# Patient Record
Sex: MALE
[De-identification: names, ages, dates, MRNs, and addresses within clinical notes are randomized per-mention and may not be internally consistent; named-entity substitution may affect disease eponyms.]

---

## 2019-04-25 LAB
ALBUMIN SERPL-MCNC: 4.3 G/DL (ref 3.5–5)
ALBUMIN/GLOB SERPL: 1.1 {RATIO} (ref 0.8–2)
ALP SERPL-CCNC: 82 IU/L (ref 40–150)
ALT SERPL-CCNC: 43 IU/L (ref 0–55)
ANION GAP SERPL CALC-SCNC: 16.2 MMOL/L (ref 8–16)
BASOPHILS # BLD AUTO: 0 10*3/UL (ref 0–0.1)
BASOPHILS NFR BLD AUTO: 0.6 % (ref 0–1)
BUN SERPL-MCNC: 14 MG/DL (ref 7–26)
BUN/CREAT SERPL: 15 (ref 6–25)
CALCIUM SERPL-MCNC: 9.9 MG/DL (ref 8.4–10.2)
CHLORIDE SERPL-SCNC: 101 MMOL/L (ref 98–107)
CO2 SERPL-SCNC: 25 MMOL/L (ref 22–29)
DEPRECATED INR PLAS: 0.93
DEPRECATED NEUTROPHILS # BLD AUTO: 3.8 10*3/UL (ref 2.1–6.9)
EGFRCR SERPLBLD CKD-EPI 2021: > 60 ML/MIN (ref 60–?)
EOSINOPHIL # BLD AUTO: 0.1 10*3/UL (ref 0–0.4)
EOSINOPHIL NFR BLD AUTO: 1.6 % (ref 0–6)
ERYTHROCYTE [DISTWIDTH] IN CORD BLOOD: 12.4 % (ref 11.7–14.4)
GLOBULIN PLAS-MCNC: 4 G/DL (ref 2.3–3.5)
GLUCOSE SERPLBLD-MCNC: 82 MG/DL (ref 74–118)
HCT VFR BLD AUTO: 52.1 % (ref 38.2–49.6)
HGB BLD-MCNC: 16.8 G/DL (ref 14–18)
LYMPHOCYTES # BLD: 2.6 10*3/UL (ref 1–3.2)
LYMPHOCYTES NFR BLD AUTO: 36.7 % (ref 18–39.1)
MCH RBC QN AUTO: 31.2 PG (ref 28–32)
MCHC RBC AUTO-ENTMCNC: 32.2 G/DL (ref 31–35)
MCV RBC AUTO: 96.7 FL (ref 81–99)
MONOCYTES # BLD AUTO: 0.6 10*3/UL (ref 0.2–0.8)
MONOCYTES NFR BLD AUTO: 7.9 % (ref 4.4–11.3)
NEUTS SEG NFR BLD AUTO: 52.9 % (ref 38.7–80)
PLATELET # BLD AUTO: 200 X10E3/UL (ref 140–360)
POTASSIUM SERPL-SCNC: 4.2 MMOL/L (ref 3.5–5.1)
PROTHROMBIN TIME: 13 SECONDS (ref 11.9–14.5)
RBC # BLD AUTO: 5.39 X10E6/UL (ref 4.3–5.7)
SODIUM SERPL-SCNC: 138 MMOL/L (ref 136–145)

## 2019-04-26 ENCOUNTER — HOSPITAL ENCOUNTER (OUTPATIENT)
Dept: HOSPITAL 88 - CATH LAB | Age: 49
Discharge: HOME | End: 2019-04-26
Attending: INTERNAL MEDICINE
Payer: COMMERCIAL

## 2019-04-26 VITALS — DIASTOLIC BLOOD PRESSURE: 84 MMHG | SYSTOLIC BLOOD PRESSURE: 126 MMHG

## 2019-04-26 VITALS — HEIGHT: 71 IN | WEIGHT: 210 LBS | BODY MASS INDEX: 29.4 KG/M2

## 2019-04-26 VITALS — SYSTOLIC BLOOD PRESSURE: 147 MMHG | DIASTOLIC BLOOD PRESSURE: 63 MMHG

## 2019-04-26 VITALS — SYSTOLIC BLOOD PRESSURE: 127 MMHG | DIASTOLIC BLOOD PRESSURE: 70 MMHG

## 2019-04-26 VITALS — DIASTOLIC BLOOD PRESSURE: 100 MMHG | SYSTOLIC BLOOD PRESSURE: 137 MMHG

## 2019-04-26 VITALS — SYSTOLIC BLOOD PRESSURE: 124 MMHG | DIASTOLIC BLOOD PRESSURE: 90 MMHG

## 2019-04-26 VITALS — DIASTOLIC BLOOD PRESSURE: 85 MMHG | SYSTOLIC BLOOD PRESSURE: 129 MMHG

## 2019-04-26 VITALS — DIASTOLIC BLOOD PRESSURE: 77 MMHG | SYSTOLIC BLOOD PRESSURE: 144 MMHG

## 2019-04-26 VITALS — DIASTOLIC BLOOD PRESSURE: 88 MMHG | SYSTOLIC BLOOD PRESSURE: 129 MMHG

## 2019-04-26 VITALS — DIASTOLIC BLOOD PRESSURE: 91 MMHG | SYSTOLIC BLOOD PRESSURE: 131 MMHG

## 2019-04-26 VITALS — DIASTOLIC BLOOD PRESSURE: 95 MMHG | SYSTOLIC BLOOD PRESSURE: 121 MMHG

## 2019-04-26 VITALS — SYSTOLIC BLOOD PRESSURE: 119 MMHG | DIASTOLIC BLOOD PRESSURE: 67 MMHG

## 2019-04-26 VITALS — SYSTOLIC BLOOD PRESSURE: 135 MMHG | DIASTOLIC BLOOD PRESSURE: 75 MMHG

## 2019-04-26 VITALS — SYSTOLIC BLOOD PRESSURE: 136 MMHG | DIASTOLIC BLOOD PRESSURE: 97 MMHG

## 2019-04-26 VITALS — DIASTOLIC BLOOD PRESSURE: 119 MMHG | SYSTOLIC BLOOD PRESSURE: 189 MMHG

## 2019-04-26 VITALS — DIASTOLIC BLOOD PRESSURE: 90 MMHG | SYSTOLIC BLOOD PRESSURE: 124 MMHG

## 2019-04-26 VITALS — DIASTOLIC BLOOD PRESSURE: 66 MMHG | SYSTOLIC BLOOD PRESSURE: 129 MMHG

## 2019-04-26 DIAGNOSIS — Z88.0: ICD-10-CM

## 2019-04-26 DIAGNOSIS — I25.10: Primary | ICD-10-CM

## 2019-04-26 DIAGNOSIS — I10: ICD-10-CM

## 2019-04-26 DIAGNOSIS — R06.09: ICD-10-CM

## 2019-04-26 DIAGNOSIS — Z01.812: ICD-10-CM

## 2019-04-26 DIAGNOSIS — R94.39: ICD-10-CM

## 2019-04-26 DIAGNOSIS — E78.5: ICD-10-CM

## 2019-04-26 DIAGNOSIS — Z96.652: ICD-10-CM

## 2019-04-26 DIAGNOSIS — Z82.49: ICD-10-CM

## 2019-04-26 DIAGNOSIS — R55: ICD-10-CM

## 2019-04-26 PROCEDURE — 93458 L HRT ARTERY/VENTRICLE ANGIO: CPT

## 2019-04-26 PROCEDURE — 80053 COMPREHEN METABOLIC PANEL: CPT

## 2019-04-26 PROCEDURE — 92928 PRQ TCAT PLMT NTRAC ST 1 LES: CPT

## 2019-04-26 PROCEDURE — 85025 COMPLETE CBC W/AUTO DIFF WBC: CPT

## 2019-04-26 PROCEDURE — 36415 COLL VENOUS BLD VENIPUNCTURE: CPT

## 2019-04-26 PROCEDURE — 85610 PROTHROMBIN TIME: CPT

## 2019-04-26 NOTE — NUR
1330 TR band titration initiated 15cc balloon 

1330 -2cc positive 13cc no sign pain,pallor,or bleeding

1345 -2cc positive 11cc no sign pain,pallor,or bleeding

1400 -2cc positive 9cc no sign pain,pallor,or bleeding

1415 -2cc positive 7cc no sign pain pallor,or bleeding

1430 -2cc positive 5cc no sign pain pallor ,or bleeding

1445 -2cc positive 3cc no sign pain pallor,or bleeding

1500 tr band off stasis achieved Coban dressing and splint in place no issues pain,pallor 
pressure or dysrhythmia.

Radial pulse adequate.Explained dc plans understands precaution and importance of followup 
care

albert/rn

## 2019-04-26 NOTE — XMS REPORT
Patient Summary Document

                             Created on: 2019



NORMAN FOSS

External Reference #: [id number]

: 1970

Sex: Male



Demographics







                          Address                   [address] [place] [address]  [address]

 

                          Home Phone                [phone]

 

                          Preferred Language        Unknown

 

                          Marital Status            Unknown

 

                          Denominational Affiliation     Unknown

 

                          Race                      Unknown

 

                          Ethnic Group              Unknown





Author







                          Author                    [facility]

 

                          Address                   Unknown

 

                          Phone                     Unavailable







Care Team Providers







                    Care Team Member Name    Role                Phone

 

                    Adalberto Cabrera     Unavailable         Unavailable

 

                    JARED SCHROEDER      Unavailable         Unavailable

 

                    NOAH VOGEL    Unavailable         Unavailable







Problems

This patient has no known problems.



Allergies, Adverse Reactions, Alerts

This patient has no known allergies or adverse reactions.



Medications

This patient has no known medications.



Results







           Test Description    Test Time    Test Comments    Text Results    Atomic Results    Result

 Comments

 

                Basic Metabolic Panel    2019 15:57:00                      

 

   

 

                          Serum or plasma sodium measurement (moles/volume) (test code=2951-2)    141 mmol/L

                          136-145                    

 

                    Potassium [Moles/volume] in Serum or Plasma (test code=2823-3)    4.0 mmol/L          3.5-5.1

                                         

 

                    Chloride [Moles/volume] in Serum or Plasma (test code=5-0)    107 mmol/L          

                                         

 

                          Carbon dioxide, total [Moles/volume] in Serum or Plasma (test code=8-9)    26 mmol/L

                          21-32                      

 

                Glucose [Mass/volume] in Serum or Plasma (test code=2345-7)    86 mg/dL                   



 

                    Urea nitrogen [Mass/volume] in Serum or Plasma (test code=3094-0)    18 mg/dL            7-18

                                         

 

                    Creatinine [Mass/volume] in Serum or Plasma (test code=2160-0)    0.96 mg/dL          0.55-1.3

                                         

 

                Glomerular Filtration Rate (test code=GFR)    84 mL           =/>90           FOR CHRONIC KIDNEY DISEASE:

  GFR       STAGE           DESCRIPTION=/>90    STAGE 1    NORMAL--OR--         
             MINIMAL KIDNEY DAMAGE WITH NORMAL GFR  60-89    STAGE 2    MILD 
DECREASE IN GFR  30-59    STAGE 3    MODERATE DECREASE IN GFR  15-29    STAGE 4 
   SEVERE DECREASE IN GFR  <15      STAGE 5    KIDNEY FAILURE The Glomerular 
Filtration Rate (GFR) has been calculated using the IDMS-Traceable MDRD Study 
Equation.

 

                    Calcium [Mass/volume] in Serum or Plasma (test code=17861-6)    8.6 mg/dL           8.5-10.1

                                         





Comment Bed:20 Test Ordered to Rule Out VTE/DVT? NLiver (Hepatic) Function
2019 15:57:00* 





                Test Item       Value           Reference Range    Comments

 

                                        Aspartate aminotransferase [Enzymatic activity/volume] in Serum or Plasma by With

 P-5 (test code=30239-8)    31 U/L              15-37                

 

                                        Alanine aminotransferase [Enzymatic activity/volume] in Serum or Plasma by With 

P-5'- (test code=1743-4)    58 U/L              12-78                

 

                                        Alkaline phosphatase [Enzymatic activity/volume] in Serum or Plasma (test code=6768-6)

                    80 U/L                             

 

                    Bilirubin.total [Mass/volume] in Serum or Plasma (test code=-2)    0.6 mg/dL           

0.2-1.0                                  

 

                          Bilirubin.direct [Mass/volume] in Serum or Plasma (test code=-7)    0.1 mg/dL 

                          0-0.2                      

 

                Protein [Mass/volume] in Serum or Plasma (test code=2885-2)    8.0 g/dL        6.4-8.2         

 

 

                                        Albumin [Mass/volume] in Serum or Plasma by Bromocresol purple (BCP) dye binding

 meth (test code=61152-5)    4.2 g/dL            3.4-5.0              

 

                Globulin (test code=GLOB)    3.8 g/dL        2.3-3.5          

 

                Albumin/Globulin Ratio (test code=A/G)    1.1             1.1-1.8          





Comment Bed:20 Test Ordered to Rule Out VTE/DVT? NTroponin I.cardiac 
[Mass/volume] in Serum or [id number] 15:57:00* 





                Test Item       Value           Reference Range    Comments

 

                          Troponin I.cardiac [Mass/volume] in Serum or Plasma (test code=10839-9)    <0.02 ng/mL

                          0.0-0.045                  





Comment Bed:20 Test Ordered to Rule Out VTE/DVT? NNatriuretic peptide.B 
prohormone N-Terminal [Mass/volume] in Serum or [id number] 15:57:00* 





                Test Item       Value           Reference Range    Comments

 

                                        Natriuretic peptide.B prohormone N-Terminal [Mass/volume] in Serum or Plasma (test

 code=33762-6)      48 pg/mL            <125                 





Comment Bed:20 Test Ordered to Rule Out VTE/DVT? NMagnesium [Mass/volume] in 
Serum or [id number] 15:57:00* 





                Test Item       Value           Reference Range    Comments

 

                    Magnesium [Mass/volume] in Serum or Plasma (test code=19123-9)    2.3 mg/dL           1.8-2.4

                                         





Comment Bed:20 Test Ordered to Rule Out VTE/DVT? NComplete blood count (CBC) 
with automated white blood cell (WBC) [id number] 15:51:00* 





                Test Item       Value           Reference Range    Comments

 

                White blood cell count (test code=IMO0002)    8.8             4.3-10.9         

 

                    Blood erythrocytes count (number/volume) (test code=26453-1)    5.09 M/ul           4.33-5.43

                                         

 

                Hemoglobin measurement (test code=IMO0002)    16.2 g/dL       13.6-17.9        

 

                Blood hematocrit (volume fraction) (test code=20570-8)    47.2 %          39.6-49.0        

 

                MCV (test code=IMO0001)    92.9 fL                   31.9

 

                MCHC (test code=MCHC)    34.4 g/dL       32.0-36.0        

 

                Platelets (test code=PLT)    241             152-406          

 

                Red Cell Distribution Width (test code=RDW)    13.3 %          12.1-15.2        

 

                Blood platelet mean volume (test code=28542-9)    7.9 fL          7.6-11.3         

 

                Neutrophils % (test code=MARILYN%)    65.6 %          41.7-73.7        

 

                Lymphocytes/leuk NFr Bld (test code=26478-8)    25.2 %          15.3-44.8        

 

                Monocyte percentage (test code=5905-5)    8.0 %           3.3-12.3         

 

                Eosinophil % (test code=713-8)    0.8 %           0-4.4            

 

                Basophil % (test code=26445-7)    0.4 %           0-1.3            

 

                Absolute neutrophil count (test code=751-8)    5.8             1.8-8.0          

 

                Absolute lymphocyte count (test code=20585-6)    2.2             0.7-4.9          

 

                Absolute monocyte count (test code=742-7)    0.7             0.1-1.3          

 

                Absolute Eosinophils (test code=EOA)    0.1             0-0.5            

 

                Absolute Basophils (test code=BASA)    0.0             0-0.5            





Prothrombin time (PT) with international normalized ratio (INR)2019 
15:50:00* 





                Test Item       Value           Reference Range    Comments

 

                PT Prothrombin Time (test code=PROTIME)    12.0 s          9.5-12.5         

 

                INR in Blood by Coagulation assay (test code=34714-6)    1.02                            Monitor pts using

 INR value (not prothrombin time)   INR Coumadin Therapy: Low Range 
(prophylaxis) 2.0-3.0 High Range (high risk of clot formation) 2.5-3.5





NChest Single [id number] 15:38:00[facility] [place] [address]      RADIOLOGY SERVICES REPORT    Name: 
NORMAN FOSS         Acct Number: [id number]  :1970 Age:48 
Sex:M Ord Phys: [facility]            Unit Number: [id number]         May 
Care Dr: JOYCE  Status: REG ER ER    Accession #: [id number]                  
Exam Date: 19    EXAM DESCRIPTION:  [facility] Single View2019 3:31 pm 
      CLINICAL HISTORY:  Chest pain       COMPARISON:  none       FINDINGS:   
The lungs appear clear of acute infiltrate. The heart is normal size       
IMPRESSION:   No acute abnormalities displayed             Signed By: 
Justin Villafuerte MD        Signed AT: 19 1538           FL, RAD TECH IN 
OR/30 MINUTE [id number] 17:07:00Reason for exam:->right infinity total
 ankle replacement calcaneal oseotomyPROCEDURE PERFORMED IN O.R. - PLEASE REFER 
TO THE INTRAOPERATIVE REPORT. Electronically signed by: ELECTRONICALLYVER BY 
RADIOLOGY on 2018 05:07 PM CT, CHEST WITH IV CONTRAST- PE TEST DESIGN
2018 21:48:00Reason for exam:->LEG PAINReason for exam:->CHEST PAINWhat is
 the patient's sedation requirement?->No SedationFINAL REPORT PATIENT ID:   
[id number] TECHNIQUE: CT scan of the chest WITH intravenous contrast. Dose 
modulation, iterative reconstruction, and/or weight-based adjustment of the 
mA/kV was utilized to reduce the radiation dose to as low as reasonably 
achievable. INDICATION: 47-year-old man with leg pain and chest pain. CO
MPARISON: None.  FINDINGS:  LINES/TUBES: None. PULMONARY ARTERIES: Proximal to t
he bifurcation of the main pulmonary artery, the main pulmonary artery is 2.3 cm
 in diameter.  No filling defects within the pulmonary arteries to suggest pulmo
nary embolus. LUNGS AND AIRWAYS: The lungs and airways are normal without focal 
abnormality. PLEURA: The pleural spaces are clear. HEART AND MEDIASTINUM: The vi
sualized thyroid gland is normal. No significant mediastinal, hilar, or axillary
 lymphadenopathy. The heart and pericardium are within normal limits. SOFT TISSU
ES AND BONES: Unremarkable. UPPER ABDOMEN: Decreased attenuation of the liver, c
onsistent with hepatic steatosis. The gastric fundus is wrapped around the dista
l esophagus above the diaphragm.  IMPRESSION:No pulmonary emboli or other acute 
abnormalities in the chest. Findings at the gastroesophageal junction may repres
ent changes from prior fundoplication, which has herniated above the diaphragm, 
versus paraesophageal hiatal hernia. Signed: Alex Muñozort Verified D
ate/Time:  2018 21:48:27 Reading Location: [facility] C013 Consult Reading Ro
om    Electronically signed by: ALEX MUÑOZ MD on 2018 09:48 PM 
RAD, CHEST, 1 VIEW, NON [id number] 14:44:00Reason for exam:->LEG PAINReason 
for exam:->CHEST PAINFINAL REPORT PATIENT ID:   [id number] Chest one view 
INDICATION: Leg pain, chest pain COMPARISON: None available IMPRESSION: There is
 no focal consolidation, vascular congestion, pleural effusion, or pneumothorax.
 Heart size is at the upper limit of normal. The aorta is mildly 
ectatic/tortuous. There are degenerative spine changes. Signed: Anu Hortoneport Verified Date/Time:  2018 14:44:51 Reading Location: 
[facility] B1 C013W Consult Reading Room   Electronically signed by: ANU HORTON M.D. on 2018 02:44 PM B-TYPE NATRIURETIC FACTOR (BNP)2018 
14:35:00* 





                Test Item       Value           Reference Range    Comments

 

                B-TYPE NATRIURETIC PEPTIDE (BEAKER) (test code=700)    40 pg/mL        0-100            





CREATINE KINASE (CK), TOTAL AND [id number] 14:30:00* 





                Test Item       Value           Reference Range    Comments

 

                CREATINE KINASE TOTAL (BEAKER) (test code=380)    223 U/L                    

 

                CREATINE KINASE-MB (BEAKER) (test code=750)    1.0 ng/mL       0.0-6.6          

 

                CREATINE KINASE-MB INDEX (BEAKER) (test code=395)    0.4 %                            





CK-MB Reference Range:<6.7      Normal6.7-10.0  Borderline>10.0     Abnormal
TROPONIN -17-69 14:30:00* 





                Test Item       Value           Reference Range    Comments

 

                TROPONIN I (BEAKER) (test code=397)    0.01 ng/mL      0.00-0.03        





Troponin I (TnI) levels must be interpreted in the context of the presenting sym
ptoms and the clinical findings. Elevated TnI levels indicate myocardial damage,
 but are not specific for ischemic heart disease. Elevated TnI levels are seen i
n patients with other cardiac conditions (including myocarditis and congestive h
eart failure), and slight TnI elevations occur in patients with other conditions
, including sepsis, renal failure, acidosis, acute neurological disease, and per
sistent tachyarrhythmia.[id number] 14:23:00* 





                Test Item       Value           Reference Range    Comments

 

                MAGNESIUM (BEAKER) (test code=627)    2.1 mg/dL       1.6-2.6          





BASIC METABOLIC [id number] 14:23:00* 





                Test Item       Value           Reference Range    Comments

 

                SODIUM (BEAKER) (test code=381)    141 meq/L       136-145          

 

                POTASSIUM (BEAKER) (test code=379)    4.1 meq/L       3.5-5.1          

 

                CHLORIDE (BEAKER) (test code=382)    106 meq/L                  

 

                CO2 (BEAKER) (test code=355)    25 meq/L        22-29            

 

                BLOOD UREA NITROGEN (BEAKER) (test code=354)    13 mg/dL        7-21             

 

                CREATININE (BEAKER) (test code=358)    0.97 mg/dL      0.57-1.25        

 

                GLUCOSE RANDOM (BEAKER) (test code=652)    82 mg/dL                   

 

                CALCIUM (BEAKER) (test code=697)    9.4 mg/dL       8.4-10.2         

 

                EGFR (BEAKER) (test code=1092)    83 mL/min/1.73 sq m                    ESTIMATED GFR IS NOT AS 

ACCURATE AS CREATININE CLEARANCE IN PREDICTING GLOMERULAR FILTRATION RATE. 
ESTIMATED GFR IS NOT APPLICABLE FOR DIALYSIS PATIENTS.





PT/[id number] 14:20:00* 





                Test Item       Value           Reference Range    Comments

 

                PROTIME (BEAKER) (test code=759)    14.1 seconds    11.7-14.7        

 

                INR (BEAKER) (test code=370)    1.1             <=5.9            

 

                PARTIAL THROMBOPLASTIN TIME (BEAKER) (test code=760)    30.1 seconds    22.5-36.0        







RECOMMENDED COUMADIN/WARFARIN INR THERAPY RANGESSTANDARD DOSE: 2.0 - 3.0   Inclu
jaycob: PROPHYLAXIS for venous thrombosis, systemic embolization; TREATMENT for shahida
ous thrombosis and/or pulmonary embolus.HIGH RISK: Target INR is 2.5-3.5 for pat
ients with mechanical heart valves.CBC W/PLT COUNT & AUTO [id number]
 12:44:00* 





                Test Item       Value           Reference Range    Comments

 

                WHITE BLOOD CELL COUNT (BEAKER) (test code=775)    5.9 K/ L        3.5-10.5         

 

                RED BLOOD CELL COUNT (BEAKER) (test code=761)    4.98 M/ L       4.63-6.08        

 

                HEMOGLOBIN (BEAKER) (test code=410)    15.3 GM/DL      13.7-17.5        

 

                HEMATOCRIT (BEAKER) (test code=411)    48.2 %          40.1-51.0        

 

                MEAN CORPUSCULAR VOLUME (BEAKER) (test code=753)    96.8 fL         79.0-92.2        

 

                MEAN CORPUSCULAR HEMOGLOBIN (BEAKER) (test code=751)    30.7 pg         25.7-32.2        

 

                    MEAN CORPUSCULAR HEMOGLOBIN CONC (BEAKER) (test code=752)    31.7 GM/DL          32.3-36.5

                                         

 

                RED CELL DISTRIBUTION WIDTH (BEAKER) (test code=412)    12.5 %          11.6-14.4        

 

                PLATELET COUNT (BEAKER) (test code=756)    175 K/CU MM     150-450          

 

                MEAN PLATELET VOLUME (BEAKER) (test code=754)    10.2 fL         9.4-12.4         

 

                NUCLEATED RED BLOOD CELLS (BEAKER) (test code=413)    0 /100 WBC      0-0              

 

                NEUTROPHILS RELATIVE PERCENT (BEAKER) (test code=429)    57 %                             

 

                LYMPHOCYTES RELATIVE PERCENT (BEAKER) (test code=430)    30 %                             

 

                MONOCYTES RELATIVE PERCENT (BEAKER) (test code=431)    10 %                             

 

                EOSINOPHILS RELATIVE PERCENT (BEAKER) (test code=432)    2 %                              

 

                BASOPHILS RELATIVE PERCENT (BEAKER) (test code=437)    2 %                              

 

                NEUTROPHILS ABSOLUTE COUNT (BEAKER) (test code=670)    3.32 K/ L       1.78-5.38        

 

                LYMPHOCYTES ABSOLUTE COUNT (BEAKER) (test code=414)    1.74 K/ L       1.32-3.57        

 

                MONOCYTES ABSOLUTE COUNT (BEAKER) (test code=415)    0.58 K/ L       0.30-0.82        

 

                EOSINOPHILS ABSOLUTE COUNT (BEAKER) (test code=416)    0.09 K/ L       0.04-0.54        

 

                BASOPHILS ABSOLUTE COUNT (BEAKER) (test code=417)    0.09 K/ L       0.01-0.08        

 

                IMMATURE GRANULOCYTES-RELATIVE PERCENT (BEAKER) (test code=2801)    1 %             0-1              





BASIC METABOLIC [id number] 06:21:00* 





                Test Item       Value           Reference Range    Comments

 

                SODIUM (BEAKER) (test code=381)    136 meq/L       136-145          

 

                POTASSIUM (BEAKER) (test code=379)    4.2 meq/L       3.5-5.1          

 

                CHLORIDE (BEAKER) (test code=382)    105 meq/L                  

 

                CO2 (BEAKER) (test code=355)    23 meq/L        22-29            

 

                BLOOD UREA NITROGEN (BEAKER) (test code=354)    10 mg/dL        7-21             

 

                CREATININE (BEAKER) (test code=358)    0.85 mg/dL      0.57-1.25        

 

                GLUCOSE RANDOM (BEAKER) (test code=652)    117 mg/dL                  

 

                CALCIUM (BEAKER) (test code=697)    9.0 mg/dL       8.4-10.2         

 

                EGFR (BEAKER) (test code=1092)    97 mL/min/1.73 sq m                    ESTIMATED GFR IS NOT AS 

ACCURATE AS CREATININE CLEARANCE IN PREDICTING GLOMERULAR FILTRATION RATE. 
ESTIMATED GFR IS NOT APPLICABLE FOR DIALYSIS PATIENTS.





CBC (HEMOGRAM ONLY)2017 05:57:00* 





                Test Item       Value           Reference Range    Comments

 

                WHITE BLOOD CELL COUNT (BEAKER) (test code=775)    10.5 K/ L       3.5-10.5         

 

                RED BLOOD CELL COUNT (BEAKER) (test code=761)    4.56 M/ L       4.63-6.08        

 

                HEMOGLOBIN (BEAKER) (test code=410)    14.0 GM/DL      13.7-17.5        

 

                HEMATOCRIT (BEAKER) (test code=411)    41.6 %          40.1-51.0        

 

                MEAN CORPUSCULAR VOLUME (BEAKER) (test code=753)    91.2 fL         79.0-92.2        

 

                MEAN CORPUSCULAR HEMOGLOBIN (BEAKER) (test code=751)    30.7 pg         25.7-32.2        

 

                    MEAN CORPUSCULAR HEMOGLOBIN CONC (BEAKER) (test code=752)    33.7 GM/DL          32.3-36.5

                                         

 

                RED CELL DISTRIBUTION WIDTH (BEAKER) (test code=412)    12.3 %          11.6-14.4        

 

                PLATELET COUNT (BEAKER) (test code=756)    213 K/CU MM     150-450          

 

                MEAN PLATELET VOLUME (BEAKER) (test code=754)    9.7 fL          9.4-12.4         

 

                NUCLEATED RED BLOOD CELLS (BEAKER) (test code=413)    0 /100 WBC      0-0              





CBC (HEMOGRAM ONLY)2017 05:26:00* 





                Test Item       Value           Reference Range    Comments

 

                WHITE BLOOD CELL COUNT (BEAKER) (test code=775)    12.0 K/ L       3.5-10.5         

 

                RED BLOOD CELL COUNT (BEAKER) (test code=761)    4.73 M/ L       4.63-6.08        

 

                HEMOGLOBIN (BEAKER) (test code=410)    14.6 GM/DL      13.7-17.5        

 

                HEMATOCRIT (BEAKER) (test code=411)    43.8 %          40.1-51.0        

 

                MEAN CORPUSCULAR VOLUME (BEAKER) (test code=753)    92.6 fL         79.0-92.2        

 

                MEAN CORPUSCULAR HEMOGLOBIN (BEAKER) (test code=751)    30.9 pg         25.7-32.2        

 

                    MEAN CORPUSCULAR HEMOGLOBIN CONC (BEAKER) (test code=752)    33.3 GM/DL          32.3-36.5

                                         

 

                RED CELL DISTRIBUTION WIDTH (BEAKER) (test code=412)    12.2 %          11.6-14.4        

 

                PLATELET COUNT (BEAKER) (test code=756)    237 K/CU MM     150-450          

 

                MEAN PLATELET VOLUME (BEAKER) (test code=754)    9.9 fL          9.4-12.4         

 

                NUCLEATED RED BLOOD CELLS (BEAKER) (test code=413)    0 /100 WBC      0-0              





[id number] 10:49:00* 





                Test Item       Value           Reference Range    Comments

 

                HEMOGLOBIN (BEAKER) (test code=410)    15.7 GM/DL      13.7-17.5        





PLATELET [id number] 10:49:00* 





                Test Item       Value           Reference Range    Comments

 

                PLATELET COUNT (BEAKER) (test code=756)    227 K/CU MM     150-450

## 2019-04-26 NOTE — NUR
1530 Tr BAND completed titrated. Coban dressing placed with 2x2 gauze. Positive radial pulse 
no gross issues with pain pallor pressure or pain.Remains NSR Tolerating po intake. called 
Dr Sykes and Dr Randle. pt wanting dc at 4pm. MD preferences 6pm after 5pm dose Brilinta to 
be given.

Pt and family agreed Copies of DC papers given to Family.

albert/rn

## 2019-04-26 NOTE — NUR
1750 Dr. Moise here recheck pt and explained outcomes and POC with precautions. Rt Tr band 
site remains intact with wrist brace no gross signs pain pallor pressure or bleeding. 
Dressed and escorted to car per Ap RN. Iv removed site w/o s/s infiltration Coban and 2x2 
dressing in place. Denies co of CP or SOB aware of importance of f/o care and continue new 
medications. Back to baseline tolerating po intake.Has copies of dc plans

ds/rn

## 2019-04-26 NOTE — NUR
1206 Received in Cath Lab recovery Rm #10 Report form Michelle BABIN [facility] with stent placement. via 
rt TR band approach.Oozing at site Dr Moise at bedside. Add 2cc air to TR Band by Dr Moise 
Bleeding held. Family at bedside. Instructed on restrictions to keep rt arm still.Tolerating 
po intake.

ds/rn

## 2019-04-26 NOTE — NUR
1700 ambulated to bathroom Void qs tolerated well Rt arm with wrist splint to TR band site 
health w/o  gross signs pain,pallor,pressor,or bleeding.Denies CO Cp or SOB 5pm dose 
Brilinta given po 90mg Family has RX for home care,

ds/rn

## 2019-04-26 NOTE — XMS REPORT
Clinical Summary

                             Created on: 2019



Santos Thorpe

External Reference #: [id number]

: 1970

Sex: Male



Demographics







                          Address                   [address] [place] [address] [address]

[place]  [address]

 

                          Home Phone                [phone]

 

                          Preferred Language        English

 

                          Marital Status            Unknown

 

                          Latter day Affiliation     Jewish

 

                          Race                      White

 

                          Ethnic Group              /Latin





Author







                          Author                    [facility]

 

                          Organization              [facility]

 

                          Address                   Unknown

 

                          Phone                     Unavailable







Support







                Name            Relationship    Address         Phone

 

                Jayde Salamanca            Unknown         [phone]







Care Team Providers







                    Care Team Member Name    Role                Phone

 

                    Omar Cook        PCP                 [phone]







Allergies







                                        Comments



                 Active Allergy     Reactions       Severity        Noted Date 

 

                                        



As a kid



                     Penicillins         Other (See          2017 



                                         Comments)   







Medications







                          End Date                  Status



              Medication     Sig          Dispensed     Refills      Start  



                                         Date  

 

                                                    Active



                     QUEtiapine (SEROQUEL) 100     Take 100 mg         0   



                           MG tablet                 by mouth     



                                         nightly.     

 

                                                    Active



              rivaroxaban (XARELTO) 10     Take 1 tablet     30 tablet     0            /201  



                     mg Tab tablet       (10 mg total)       7  



                                         by mouth     



                                         daily with     



                                         dinner.     

 

                                                    Active



              apixaban (ELIQUIS) 5 mg     Take 1 tablet     74 tablet     0            /201  



                     Tab tablet          (5 mg total)        8  



                                         by mouth 2     



                                         (two) times     



                                         daily Take 2     



                                         tablets twice     



                                         a day for 7     



                                         days then 1     



                                         tablet twice     



                                         a day..     







Active Problems







 



                           Problem                   Noted Date

 

 



                           Osteoarthritis of right ankle, unspecified osteoarthritis type     2017

 

 



                           Osteoarthritis of right ankle     2017







Social History







                                        Date



                 Tobacco Use     Types           Packs/Day       Years Used 

 

                                         



                                         Never Smoker    

 

    



                                         Smokeless Tobacco: Never   



                                         Used   









   



                 Alcohol Use     Drinks/Week     oz/Week         Comments

 

   



                     Yes                 4 Cans of           2.4 



                                         beer  









 



                           Sex Assigned at Birth     Date Recorded

 

 



                                         Not on file 









                                        Industry



                           Job Start Date            Occupation 

 

                                        Not on file



                           Not on file               Not on file 









                                        Travel End



                           Travel History            Travel Start 

 





                                         No recent travel history available.







Last Filed Vital Signs

Not on file



Plan of Treatment





Not on file



Implants







                    Device Identifier    Shelf Expiration Date    Model / Serial / Lot



                 Implanted       Type            Area            Manufactur   



                                         er   

 

                                        [id number] /

 /

                                        [id number]



                     Inbone Tibial Top Stem Plasma Spray      Right: Ankle        [employer]   



                           Implanted: Qty: 1 on 2017 by       GRP:Andre Lazo MD       MED TECH   

 

                                        [id number] /

 /

                                        [id number]



                     Inbone Tibial Mid Stem Plasma Spray      Right: Ankle        [employer]   



                           Implanted: Qty: 1 on 2017 by       GRP:Andre Lazo MD       MED TECH   

 

                                        [id number] /

 /

                                        [id number]



                     Inbone Tibial Base Stem Plasma      Right: Ankle        [employer]   



                           Spray                     GRP:FARZANA   



                           Implanted: Qty: 1 on 2017 by       MED Andre Hook MD      

 

                                        [id number] /

 /

                                        [id number]



                     Inbone Tibial Tray Plasma Spray      Right: Ankle        [employer]   



                           Implanted: Qty: 1 on 2017 by       GRP:Andre Lazo MD       MED TECH   

 

                                        2025          [id number] /

 /

                                        [id number]



                     Inbone Talar Stem Plasma Spray      Right: Ankle        [employer]   



                           Implanted: Qty: 1 on 2017 by       GRP:Andre Lazo MD       MED TECH   

 

                                        [id number] /

 /

                                        [id number]



                     Inbone Talar Dome Sulcus, Plasma      Right: Ankle        [employer]   



                           Spray                     GRP:FARZANA   



                           Implanted: Qty: 1 on 2017 by       MED TECH   



                                         Andre Jimenez MD      

 

                                        2018          [id number] /

 /

                                        [id number]



                     Inbone Polyinsert      Ankle               [employer]   



                           Implanted: Qty: 1 on 2017 by       GRP:Andre Lazo MD       MED TECH   

 

                                                            [id number] /

 /





                     Darco Headed Screw 6.5x50mm      Ankle               [employer]   



                           Implanted: Qty: 1 on 2017 by       GRP:Andre Lazo MD       MED TECH   

 

                                                            [id number] /

 /





                     Darco Headed Screw 7.5 X 50      Right: Ankle        [employer]   



                           Implanted: Qty: 1 on 2017 by       GRP:Andre Lazo MD       MED TECH   







Results

Not on fileafter 2018



Insurance







     



            Payer      Benefit     Subscriber ID     Type       Phone      Address



                                         Plan /    



                                         Group    

 

     



                     [facility]              xxxxxxxx   



                           CARE                      MEDICAL    



                                         RESOURCES    



                                         CHOICE POS    









     



            Guarantor Name     Account     Relation to     Date of     Phone      Billing Address



                     Type                Patient             Birth  

 

     



            Santos Thorpe     Personal/F     Self       1970     [phone]     [address]



                     [place]               (Home)              [address]



                                         [place] [address]







Advance Directives





For more information, please contact:



[address] [address] [phone]









                          Date Inactivated          Comments



                           Code Status               Date Activated  

 

                          2017  6:09 PM        



                           Full Code                 2017 10:31 AM  









  



                           This code status was determined by:     Patient

## 2019-04-26 NOTE — NUR
1207 back to baseline orientation. Respiration shallow and regular 98% on room air. Abdomen 
soft and non tender denies necessity to defecate or urinate.lefthand iv site w/o s/s 
infiltration Site w/o s/s infiltration.Monitor SR w/o ectopy Family at bedside.Mother 
Larissa at bedside with daughter.  

Explained POC and DC instructions RX given to family for Brilinta and coupon card.

Rt TR band site w/w s/s bleeding at this time Titration to be started at 1330.Adequate 
radial pulse.

ds/rn

## 2019-05-13 NOTE — OPERATIVE REPORT
DATE OF PROCEDURE:  04/26/2019

 

SURGEON:  Harinder Johnson MD

 

CARDIAC CATHETERIZATION REPORT

 

INDICATION FOR PROCEDURE:  Chest pain.  Positive stress test.  Coronary artery disease.

 

PROCEDURE PERFORMED:  PCI to the right posterolateral branch with drug-eluting stent x1.

 

PROCEDURE DETAILS:  Diagnostic angiography was performed by Dr. Estefany Mancini.  Please

see her procedure note for details of the diagnostic angiography procedure.  After

coronary angiography was completed, I reviewed the images and discussed the case with

primary cardiologist, Dr. Estefany Mancini and we jointly decided to proceed with PCI of the

right PLB system.  For PCI of the right PLB, a 6-Macanese JR4 guide was used which

provided adequate support.  Lesion was crossed with some difficulty using Runthrough

wire.  We had difficulty crossing the lesion with 2.0 balloon and with some difficulty,

a 1.5 x 8 mm balloon was able to cross the lesion.  We proceeded to pre-dilate the

lesion with the said balloon followed by 2.5 x 12 mm balloon.  This resulted in good

pre-dilation and we decided to proceed with PCI of the lesion using Synergy 2.75 x 16 mm

drug-eluting stent.  This provided an excellent angiographic result without any

significant dissection, thrombus, or spasm.  All catheters were removed over a wire.

Access site was closed using a TR band. 

 

ESTIMATED BLOOD LOSS:  20 mL.

 

GRAFTS AND IMPLANTS:  Synergy 2.75 x 16 mm drug-eluting stent.

 

SPECIMEN REMOVED:  None.

 

COMPLICATIONS:  None.

 

FINAL RECOMMENDATIONS:  

1. Continue optimal medical therapy and risk factor control.

2. Continue aspirin 81 mg daily and ticagrelor 90 mg b.i.d. for at least one year

followed by aspirin 81 mg daily for life. 

3. Follow up in clinic with Dr. Estefany Mancini two weeks post procedure.

 

 

 

 

______________________________

Harinder Johnson MD

 

KVP/MODL

D:  05/13/2019 11:27:51

T:  05/13/2019 12:05:03

Job #:  [id number]

## 2020-03-06 LAB
ALBUMIN SERPL-MCNC: 4.1 G/DL (ref 3.5–5)
ALBUMIN/GLOB SERPL: 1.2 {RATIO} (ref 0.8–2)
ALP SERPL-CCNC: 64 IU/L (ref 40–150)
ALT SERPL-CCNC: 27 IU/L (ref 0–55)
ANION GAP SERPL CALC-SCNC: 11.2 MMOL/L (ref 8–16)
BASOPHILS # BLD AUTO: 0 10*3/UL (ref 0–0.1)
BASOPHILS NFR BLD AUTO: 0.6 % (ref 0–1)
BUN SERPL-MCNC: 17 MG/DL (ref 7–26)
BUN/CREAT SERPL: 17 (ref 6–25)
CALCIUM SERPL-MCNC: 9.2 MG/DL (ref 8.4–10.2)
CHLORIDE SERPL-SCNC: 103 MMOL/L (ref 98–107)
CO2 SERPL-SCNC: 30 MMOL/L (ref 22–29)
DEPRECATED INR PLAS: 1.02
DEPRECATED NEUTROPHILS # BLD AUTO: 4.4 10*3/UL (ref 2.1–6.9)
EGFRCR SERPLBLD CKD-EPI 2021: > 60 ML/MIN (ref 60–?)
EOSINOPHIL # BLD AUTO: 0.1 10*3/UL (ref 0–0.4)
EOSINOPHIL NFR BLD AUTO: 1.3 % (ref 0–6)
ERYTHROCYTE [DISTWIDTH] IN CORD BLOOD: 12.2 % (ref 11.7–14.4)
GLOBULIN PLAS-MCNC: 3.5 G/DL (ref 2.3–3.5)
GLUCOSE SERPLBLD-MCNC: 109 MG/DL (ref 74–118)
HCT VFR BLD AUTO: 45.6 % (ref 38.2–49.6)
HGB BLD-MCNC: 15.2 G/DL (ref 14–18)
LYMPHOCYTES # BLD: 2.1 10*3/UL (ref 1–3.2)
LYMPHOCYTES NFR BLD AUTO: 28.7 % (ref 18–39.1)
MCH RBC QN AUTO: 32.7 PG (ref 28–32)
MCHC RBC AUTO-ENTMCNC: 33.3 G/DL (ref 31–35)
MCV RBC AUTO: 98.1 FL (ref 81–99)
MONOCYTES # BLD AUTO: 0.6 10*3/UL (ref 0.2–0.8)
MONOCYTES NFR BLD AUTO: 8.1 % (ref 4.4–11.3)
NEUTS SEG NFR BLD AUTO: 60.9 % (ref 38.7–80)
PLATELET # BLD AUTO: 211 X10E3/UL (ref 140–360)
POTASSIUM SERPL-SCNC: 4.2 MMOL/L (ref 3.5–5.1)
PROTHROMBIN TIME: 14 SECONDS (ref 11.9–14.5)
RBC # BLD AUTO: 4.65 X10E6/UL (ref 4.3–5.7)
SODIUM SERPL-SCNC: 140 MMOL/L (ref 136–145)

## 2020-03-11 ENCOUNTER — HOSPITAL ENCOUNTER (OUTPATIENT)
Dept: HOSPITAL 88 - CATH LAB | Age: 50
Discharge: HOME | End: 2020-03-11
Attending: INTERNAL MEDICINE
Payer: COMMERCIAL

## 2020-03-11 VITALS — DIASTOLIC BLOOD PRESSURE: 57 MMHG | SYSTOLIC BLOOD PRESSURE: 101 MMHG

## 2020-03-11 VITALS — SYSTOLIC BLOOD PRESSURE: 104 MMHG | DIASTOLIC BLOOD PRESSURE: 75 MMHG

## 2020-03-11 VITALS — DIASTOLIC BLOOD PRESSURE: 73 MMHG | SYSTOLIC BLOOD PRESSURE: 116 MMHG

## 2020-03-11 VITALS — BODY MASS INDEX: 28.7 KG/M2 | HEIGHT: 71 IN | WEIGHT: 205 LBS

## 2020-03-11 VITALS — DIASTOLIC BLOOD PRESSURE: 77 MMHG | SYSTOLIC BLOOD PRESSURE: 114 MMHG

## 2020-03-11 VITALS — DIASTOLIC BLOOD PRESSURE: 40 MMHG | SYSTOLIC BLOOD PRESSURE: 100 MMHG

## 2020-03-11 VITALS — DIASTOLIC BLOOD PRESSURE: 57 MMHG | SYSTOLIC BLOOD PRESSURE: 105 MMHG

## 2020-03-11 VITALS — SYSTOLIC BLOOD PRESSURE: 115 MMHG | DIASTOLIC BLOOD PRESSURE: 73 MMHG

## 2020-03-11 VITALS — SYSTOLIC BLOOD PRESSURE: 104 MMHG | DIASTOLIC BLOOD PRESSURE: 77 MMHG

## 2020-03-11 VITALS — SYSTOLIC BLOOD PRESSURE: 116 MMHG | DIASTOLIC BLOOD PRESSURE: 77 MMHG

## 2020-03-11 VITALS — DIASTOLIC BLOOD PRESSURE: 44 MMHG | SYSTOLIC BLOOD PRESSURE: 107 MMHG

## 2020-03-11 DIAGNOSIS — E78.5: ICD-10-CM

## 2020-03-11 DIAGNOSIS — Z79.82: ICD-10-CM

## 2020-03-11 DIAGNOSIS — I10: ICD-10-CM

## 2020-03-11 DIAGNOSIS — Z79.01: ICD-10-CM

## 2020-03-11 DIAGNOSIS — I25.119: Primary | ICD-10-CM

## 2020-03-11 DIAGNOSIS — Z01.812: ICD-10-CM

## 2020-03-11 DIAGNOSIS — Z87.820: ICD-10-CM

## 2020-03-11 PROCEDURE — 80053 COMPREHEN METABOLIC PANEL: CPT

## 2020-03-11 PROCEDURE — 99152 MOD SED SAME PHYS/QHP 5/>YRS: CPT

## 2020-03-11 PROCEDURE — 93458 L HRT ARTERY/VENTRICLE ANGIO: CPT

## 2020-03-11 PROCEDURE — 36415 COLL VENOUS BLD VENIPUNCTURE: CPT

## 2020-03-11 PROCEDURE — 85610 PROTHROMBIN TIME: CPT

## 2020-03-11 PROCEDURE — 85025 COMPLETE CBC W/AUTO DIFF WBC: CPT

## 2020-03-11 NOTE — NUR
1715pm CATH LAB RECOVERY  DISCHARGE NURSING 
NOTE--------------------------------------------------------



Pt meets DC criteria. Rt Tr band site assessed for s/s of complication and presence of 
hematoma. Skin warm, dry, no discolor, and pulses present. IV removed from left ac. Distal 
tip appears intact. VS WNL. Pt denies pain, sob, or need at this time. Family at  Review of 
discharge paperwork and follow up instructions. verbalized understanding. Pt to wheelchair 
and transported to front of hospital. Transferred to private vehicle under own strength w/o 
incident with DC paperwork in hand. - albert/rn

## 2020-03-11 NOTE — NUR
1635p RADIAL COMPRESSION REMOVAL NOTE:     Initial  Cuff  volume   12  cc



           1635p  -2cc Removed  No hematoma/bleeding noted with normal  neurovascular 
function.



            1645p -5cc Removed  No hematoma/ bleeding noted with normal  neurovascular 
function.



             1700p -5cc Removed  No hematoma/bleeding noted with normal  neurovascular 
function.



           



Air removal completed. 

Stasis achieved sterile 2x2,Tegaderm,  Coban  dressing  No hematoma, bleeding noted with 
normal neurovascular function. Wrist splint in place. Pt instructed on POC.

Ds/Rn

## 2020-03-11 NOTE — NUR
1445p bedside report received from OLAF De Souza. Identifier x2.Alert oriented and appropriate, 
PERRLA, respirations even and unlabored to room air. Pulses x4 extremities equal and strong. 
Pedal pulses PT/DP X4 . Cap fill brisk < 3 sec. Rt TR band.site health No gross issues 
pain,pallor pressure or dysrhythmia. TR band decreased at 1635 and dc today if stable. 
Phoned family to return for post dc instructions.



Skin warm and dry integrity appears D/I. IV 20g to left ac intact. Presents healthy w/o s/s 
of infiltration or complaint. Abdomen soft and supple. pt offered toileting, denies need to 
urinate or defecate. No personal affects with patient.  No Family bedside. Pt  verbalizes 
understanding of POC.



Currently w/o complaint of pain or need. ds/rn

## 2020-04-23 NOTE — OPERATIVE REPORT
DATE OF PROCEDURE:  03/11/2020

 

SURGEON:  Harinder Johnson MD

 

INDICATION FOR PROCEDURE:  Chest pain, abnormal stress test.

 

PREPROCEDURE ASSESSMENT:  The risks, benefits, and alternatives of treatment explained

to the patient prior to the procedure.  The patient was deemed to be an appropriate

candidate for moderate sedation.  Informed consent was obtained and documented in the

medical record. 

 

MEDICATIONS:  Please see nursing notes for medications administered throughout the

procedure. 

 

PROCEDURES PERFORMED:  

1. Coronary angiography, right radial approach.

2. Left heart catheterization.

 

PROCEDURE IN DETAIL:  The patient was brought to the cardiac catheterization laboratory

in a fasting state.  Right wrist was prepped and draped in a sterile fashion.  A

6-Venezuelan Slender sheath was inserted in the right radial artery using modified Seldinger

technique.  Coronary angiography was performed using a 5-Venezuelan Dilma radial catheter to

engage both the left and right coronary systems.  This demonstrated patent LAD and right

PLV stent with only mild-to-moderate plaquing, otherwise no further stenting was

required.  All catheters were removed over a wire.  The site was closed using a TR band

device.  The patient tolerated the procedure well.  There were no immediate

complications. 

 

SIGNIFICANT FINDINGS:  Left main large vessel, no significant CAD.  LAD large vessel

goes to the apex, one large diagonal branch.  There is bifurcation stent in the LAD

diagonal with 0% in-stent restenoses that are widely patent.  There is 20% to 30%

plaquing proximal to the previously placed stents in the LAD without significant

obstruction.  Left circumflex, nondominant vessel.  One small-to-medium sized OM branch.

 No significant CAD.  RCA, extremely large dominant RCA.  There is 30% to 40% plaquing

in the [id number] RCA.  Large posterolateral branch with patent PLV stent.  No

significant obstructive CAD otherwise. 

 

GRAFTS AND IMPLANTS:  None.

 

SPECIMEN REMOVED:  None.

 

ESTIMATED BLOOD LOSS:  10 mL.

 

COMPLICATIONS:  None.

 

FINAL RECOMMENDATIONS:  

1. Continue dual antiplatelet therapy.

2. Follow up in clinic 2 weeks post procedure.

 

 

 

 

______________________________

Harinder Johnson MD

 

KVP/MODL

D:  04/23/2020 09:20:39

T:  04/23/2020 10:03:11

Job #:  [id number]